# Patient Record
Sex: FEMALE | Race: WHITE
[De-identification: names, ages, dates, MRNs, and addresses within clinical notes are randomized per-mention and may not be internally consistent; named-entity substitution may affect disease eponyms.]

---

## 2018-09-16 ENCOUNTER — RESULT REVIEW (OUTPATIENT)
Age: 48
End: 2018-09-16

## 2019-02-25 ENCOUNTER — RESULT REVIEW (OUTPATIENT)
Age: 49
End: 2019-02-25

## 2019-07-25 ENCOUNTER — RECORD ABSTRACTING (OUTPATIENT)
Age: 49
End: 2019-07-25

## 2019-07-25 VITALS — WEIGHT: 160 LBS | BODY MASS INDEX: 23.7 KG/M2 | HEIGHT: 69 IN

## 2019-07-25 DIAGNOSIS — Z87.2 PERSONAL HISTORY OF DISEASES OF THE SKIN AND SUBCUTANEOUS TISSUE: ICD-10-CM

## 2019-07-25 DIAGNOSIS — Z78.9 OTHER SPECIFIED HEALTH STATUS: ICD-10-CM

## 2019-07-25 PROBLEM — Z00.00 ENCOUNTER FOR PREVENTIVE HEALTH EXAMINATION: Status: ACTIVE | Noted: 2019-07-25

## 2019-07-28 ENCOUNTER — RECORD ABSTRACTING (OUTPATIENT)
Age: 49
End: 2019-07-28

## 2019-07-28 DIAGNOSIS — Z85.828 PERSONAL HISTORY OF OTHER MALIGNANT NEOPLASM OF SKIN: ICD-10-CM

## 2019-07-30 ENCOUNTER — APPOINTMENT (OUTPATIENT)
Dept: SURGERY | Facility: CLINIC | Age: 49
End: 2019-07-30
Payer: COMMERCIAL

## 2019-07-30 VITALS
HEIGHT: 69 IN | WEIGHT: 160 LBS | HEART RATE: 75 BPM | DIASTOLIC BLOOD PRESSURE: 73 MMHG | BODY MASS INDEX: 23.7 KG/M2 | SYSTOLIC BLOOD PRESSURE: 104 MMHG

## 2019-07-30 DIAGNOSIS — L72.3 SEBACEOUS CYST: ICD-10-CM

## 2019-07-30 PROCEDURE — 99203 OFFICE O/P NEW LOW 30 MIN: CPT

## 2019-07-30 NOTE — CONSULT LETTER
[Dear  ___] : Dear  [unfilled], [Consult Letter:] : I had the pleasure of evaluating your patient, [unfilled]. [Please see my note below.] : Please see my note below. [FreeTextEntry1] : Jonathan Trent- I saw Chasity Feliz. She is very diony and nervous about any procedures. I do not think this cyst needs excision currently but could if it does not completely resolve. She knows to come back if needed but currently is much improved. Thanks- Serge

## 2019-07-30 NOTE — ASSESSMENT
[FreeTextEntry1] : resolving sebaceous cyst, no intervention suggested at the moment. continue to cover, keep clean, should not use antibacterial cream at this point.

## 2019-07-30 NOTE — PLAN
[FreeTextEntry1] : to follow up in 4 to 6 weeks if there is a residual lump present for consideration of local excision. to f/u sooner if it becomes reinfected. Pt was reassured.

## 2019-07-30 NOTE — PHYSICAL EXAM
[Normal Breath Sounds] : Normal breath sounds [Normal Heart Sounds] : normal heart sounds [Abdominal Masses] : Abdominal mass present [de-identified] : NAD [de-identified] : resolving sebaceous cyst near base of sternum over xiphoid

## 2019-07-30 NOTE — HISTORY OF PRESENT ILLNESS
[de-identified] : Pt presents for evaluation of recently lanced  mid chest wall infected sebaceous cyst that developed about 2 weeks ago and was initially placed on po abx for one week. It required subsequent I&D which seemed to give relief. However, it recurred prompted her visit today. however a few days ago it self drained and is now much improved. No more drainage noted on her dressing. She has no pain.

## 2020-03-13 ENCOUNTER — RESULT REVIEW (OUTPATIENT)
Age: 50
End: 2020-03-13

## 2021-04-08 ENCOUNTER — RESULT REVIEW (OUTPATIENT)
Age: 51
End: 2021-04-08

## 2021-05-06 ENCOUNTER — RESULT REVIEW (OUTPATIENT)
Age: 51
End: 2021-05-06

## 2022-04-20 ENCOUNTER — RESULT REVIEW (OUTPATIENT)
Age: 52
End: 2022-04-20

## 2022-11-30 ENCOUNTER — RESULT REVIEW (OUTPATIENT)
Age: 52
End: 2022-11-30

## 2023-01-04 ENCOUNTER — TRANSCRIPTION ENCOUNTER (OUTPATIENT)
Age: 53
End: 2023-01-04

## 2023-03-08 ENCOUNTER — TRANSCRIPTION ENCOUNTER (OUTPATIENT)
Age: 53
End: 2023-03-08

## 2023-08-25 ENCOUNTER — TRANSCRIPTION ENCOUNTER (OUTPATIENT)
Age: 53
End: 2023-08-25

## 2023-10-05 ENCOUNTER — TRANSCRIPTION ENCOUNTER (OUTPATIENT)
Age: 53
End: 2023-10-05

## 2023-11-15 ENCOUNTER — TRANSCRIPTION ENCOUNTER (OUTPATIENT)
Age: 53
End: 2023-11-15

## 2024-04-29 ENCOUNTER — TRANSCRIPTION ENCOUNTER (OUTPATIENT)
Age: 54
End: 2024-04-29

## 2024-05-23 ENCOUNTER — TRANSCRIPTION ENCOUNTER (OUTPATIENT)
Age: 54
End: 2024-05-23

## 2024-05-30 ENCOUNTER — TRANSCRIPTION ENCOUNTER (OUTPATIENT)
Age: 54
End: 2024-05-30

## 2024-08-08 ENCOUNTER — TRANSCRIPTION ENCOUNTER (OUTPATIENT)
Age: 54
End: 2024-08-08

## 2024-10-31 ENCOUNTER — TRANSCRIPTION ENCOUNTER (OUTPATIENT)
Age: 54
End: 2024-10-31